# Patient Record
Sex: FEMALE | Race: WHITE | URBAN - METROPOLITAN AREA
[De-identification: names, ages, dates, MRNs, and addresses within clinical notes are randomized per-mention and may not be internally consistent; named-entity substitution may affect disease eponyms.]

---

## 2019-08-13 ENCOUNTER — OFFICE VISIT (OUTPATIENT)
Dept: URBAN - METROPOLITAN AREA CLINIC 76 | Facility: CLINIC | Age: 84
End: 2019-08-13
Payer: MEDICARE

## 2019-08-13 DIAGNOSIS — H43.812 VITREOUS DEGENERATION, LEFT EYE: ICD-10-CM

## 2019-08-13 DIAGNOSIS — E11.9 TYPE 2 DIABETES MELLITUS W/O COMPLICATION: Primary | ICD-10-CM

## 2019-08-13 PROCEDURE — 99204 OFFICE O/P NEW MOD 45 MIN: CPT | Performed by: OPTOMETRIST

## 2019-08-13 ASSESSMENT — KERATOMETRY
OD: 41.38
OS: 41.88

## 2019-08-13 ASSESSMENT — INTRAOCULAR PRESSURE
OD: 16
OS: 16

## 2019-08-13 NOTE — IMPRESSION/PLAN
Impression: Open angle with borderline findings, low risk, bilateral: H40.013. Cupping OU. IOP seems normal. Unknown FEH. Plan: GLC: Discussed condition in detail. Further testing needed. Schedule OCT of optic nerve, Visual Field 24-2 next available.

## 2019-08-13 NOTE — IMPRESSION/PLAN
Impression: Age-related nuclear cataract, bilateral: H25.13. poor vision OU Plan: Cataracts account for some of the patient's complaints. New glasses/contacts not expected to make significant improvement. Discussed options: surgery vs spectacle change. Patient declines surgery at this time.   Ok to schedule a cataract evaluation at patient's request.

## 2019-08-13 NOTE — IMPRESSION/PLAN
Impression: Central retinal vein occlusion, left eye, stable: L86.2518. only monoc hemorrhages OS Plan: Discussed diagnosis in detail with patient. Consult recommended with Dr. Jhonny Botello.

## 2019-08-13 NOTE — IMPRESSION/PLAN
Impression: Type 2 diabetes mellitus w/o complication: Z51.2. OU. Plan: DM: No diabetic retinopathy. Discussed ocular and systemic benefits of blood sugar control. Patient was instructed to monitor vision for changes and to call if noted. Letter sent to PCP.

## 2019-09-11 ENCOUNTER — OFFICE VISIT (OUTPATIENT)
Dept: URBAN - METROPOLITAN AREA CLINIC 76 | Facility: CLINIC | Age: 84
End: 2019-09-11
Payer: MEDICARE

## 2019-09-11 DIAGNOSIS — H40.013 OPEN ANGLE WITH BORDERLINE FINDINGS, LOW RISK, BILATERAL: Primary | ICD-10-CM

## 2019-09-11 DIAGNOSIS — H34.8122 CENTRAL RETINAL VEIN OCCLUSION, LEFT EYE, STABLE: ICD-10-CM

## 2019-09-11 PROCEDURE — 99213 OFFICE O/P EST LOW 20 MIN: CPT | Performed by: OPTOMETRIST

## 2019-09-11 PROCEDURE — 92083 EXTENDED VISUAL FIELD XM: CPT | Performed by: OPTOMETRIST

## 2019-09-11 PROCEDURE — 92133 CPTRZD OPH DX IMG PST SGM ON: CPT | Performed by: OPTOMETRIST

## 2019-09-11 ASSESSMENT — INTRAOCULAR PRESSURE
OS: 15
OD: 18

## 2019-09-11 ASSESSMENT — VISUAL ACUITY
OD: 20/50
OS: 20/100

## 2019-09-11 NOTE — IMPRESSION/PLAN
Impression: Age-related nuclear cataract, bilateral: H25.13. poor vision OU OU. Plan: Continue to monitor.

## 2019-09-11 NOTE — IMPRESSION/PLAN
Impression: Central retinal vein occlusion, left eye, stable: H46.2898. only monoc hemorrhages OS OS. Plan: Discussed diagnosis in detail with patient. Pt is scheduled w/ Dr. Isai Michelle 10/07/19.

## 2019-09-11 NOTE — IMPRESSION/PLAN
Impression: Open angle with borderline findings, low risk, bilateral: H40.013. Cupping OU. IOP seems normal. Unknown FEH. VF 9/11/19 OD: gen reduction, nasal defects?, OS: gen reduction, inf step? OCT 9/11/19: thin sup OU. Plan: GLC: Discussed condition in detail. Continue to monitor without treatment. Repeat OCT 6 mos.

## 2019-10-07 ENCOUNTER — OFFICE VISIT (OUTPATIENT)
Dept: URBAN - METROPOLITAN AREA CLINIC 76 | Facility: CLINIC | Age: 84
End: 2019-10-07
Payer: MEDICARE

## 2019-10-07 DIAGNOSIS — H25.13 AGE-RELATED NUCLEAR CATARACT, BILATERAL: Primary | ICD-10-CM

## 2019-10-07 DIAGNOSIS — H34.8120 CENTRAL RETINAL VEIN OCCLUSION, LEFT EYE, WITH MACULAR EDEMA: ICD-10-CM

## 2019-10-07 PROCEDURE — 99213 OFFICE O/P EST LOW 20 MIN: CPT | Performed by: OPHTHALMOLOGY

## 2019-10-07 PROCEDURE — 99204 OFFICE O/P NEW MOD 45 MIN: CPT | Performed by: OPHTHALMOLOGY

## 2019-10-07 PROCEDURE — 92134 CPTRZ OPH DX IMG PST SGM RTA: CPT | Performed by: OPHTHALMOLOGY

## 2019-10-07 ASSESSMENT — INTRAOCULAR PRESSURE
OS: 14
OD: 19

## 2019-10-07 NOTE — IMPRESSION/PLAN
Impression: Age-related nuclear cataract, bilateral: H25.13. Plan: Cataracts account for the patient's complaints. Discussed all risks, benefits, procedures and recovery. Patient understands changing glasses will not improve vision. Patient desires to have surgery.

## 2019-10-07 NOTE — IMPRESSION/PLAN
Impression: Central retinal vein occlusion, left eye, with macular edema: H34.8120. Plan: Discussed diagnosis in detail with patient. Discussed treatment options with patient. Discussed injection, Unable to see details on exam. Based on OCT exam shows edema left eye. Pt elects no treatment.

## 2023-04-21 ENCOUNTER — OFFICE VISIT (OUTPATIENT)
Dept: URBAN - METROPOLITAN AREA CLINIC 76 | Facility: CLINIC | Age: 88
End: 2023-04-21
Payer: MEDICARE

## 2023-04-21 DIAGNOSIS — H34.8120 CENTRAL RETINAL VEIN OCCLUSION W/ MACULAR EDEMA, LEFT EYE: ICD-10-CM

## 2023-04-21 DIAGNOSIS — H25.13 AGE-RELATED NUCLEAR CATARACT, BILATERAL: ICD-10-CM

## 2023-04-21 DIAGNOSIS — H33.22 SEROUS RETINAL DETACHMENT, LEFT EYE: Primary | ICD-10-CM

## 2023-04-21 PROCEDURE — 92134 CPTRZ OPH DX IMG PST SGM RTA: CPT | Performed by: OPHTHALMOLOGY

## 2023-04-21 PROCEDURE — 99204 OFFICE O/P NEW MOD 45 MIN: CPT | Performed by: OPHTHALMOLOGY

## 2023-04-21 ASSESSMENT — INTRAOCULAR PRESSURE
OD: 18
OS: 17

## 2023-04-21 NOTE — IMPRESSION/PLAN
Impression: Age-related nuclear cataract, bilateral: H25.13. Plan: Dense NS - recommend cataract surgery OD first (to improve quality of life) and then OS - refer back for retina care after cataract surgery OS. Pt understands guarded visual potential OS.

## 2023-04-21 NOTE — IMPRESSION/PLAN
Impression: Central retinal vein occlusion w/ macular edema, left eye: H34.8199. Plan: As above, 1). Guarded visual potential - treatment w/ long-acting steroid injections much better option than monthly anti-VEGF injections.

## 2023-04-21 NOTE — IMPRESSION/PLAN
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 Guarded visual potential. VERY ANXIOUS ABOUT TOUCHING EYES - would NOT be able to proceed w/ in-office injections. Recommend OZURDEX under anesthesia and possibly ILLUVIEN thereafter. PROCEED W/ CATARACT SURGERY FIRST to be able to better assess first. RTC w/ me after cataract surgery.

## 2023-06-06 ENCOUNTER — OFFICE VISIT (OUTPATIENT)
Dept: URBAN - METROPOLITAN AREA CLINIC 76 | Facility: CLINIC | Age: 88
End: 2023-06-06
Payer: MEDICARE

## 2023-06-06 DIAGNOSIS — E11.9 TYPE 2 DIABETES MELLITUS W/O COMPLICATION: ICD-10-CM

## 2023-06-06 DIAGNOSIS — H34.8120 CENTRAL RETINAL VEIN OCCLUSION W/ MACULAR EDEMA, LEFT EYE: ICD-10-CM

## 2023-06-06 DIAGNOSIS — H40.013 OPEN ANGLE WITH BORDERLINE FINDINGS, LOW RISK, BILATERAL: ICD-10-CM

## 2023-06-06 DIAGNOSIS — H52.223 REGULAR ASTIGMATISM, BILATERAL: ICD-10-CM

## 2023-06-06 DIAGNOSIS — H25.13 AGE-RELATED NUCLEAR CATARACT, BILATERAL: Primary | ICD-10-CM

## 2023-06-06 DIAGNOSIS — H33.22 SEROUS RETINAL DETACHMENT, LEFT EYE: ICD-10-CM

## 2023-06-06 PROCEDURE — 99215 OFFICE O/P EST HI 40 MIN: CPT | Performed by: STUDENT IN AN ORGANIZED HEALTH CARE EDUCATION/TRAINING PROGRAM

## 2023-06-06 PROCEDURE — 92136 OPHTHALMIC BIOMETRY: CPT | Performed by: STUDENT IN AN ORGANIZED HEALTH CARE EDUCATION/TRAINING PROGRAM

## 2023-06-06 RX ORDER — KETOROLAC TROMETHAMINE 5 MG/ML
0.5 % SOLUTION OPHTHALMIC
Qty: 5 | Refills: 1 | Status: ACTIVE
Start: 2023-06-06

## 2023-06-06 ASSESSMENT — INTRAOCULAR PRESSURE
OS: 16
OD: 16

## 2023-06-06 ASSESSMENT — KERATOMETRY
OD: 41.63
OS: 41.88

## 2023-06-06 ASSESSMENT — PACHYMETRY
OS: 24.71
OD: 2.83
OD: 25.07
OS: 2.92

## 2023-06-06 ASSESSMENT — VISUAL ACUITY
OS: HM
OD: 20/70

## 2023-06-06 NOTE — IMPRESSION/PLAN
Impression: Age-related nuclear cataract, bilateral: H25.13. Plan: Chart has been reviewed and additional testing is necessary including A-scan/Lens Biometry, Topography, and macula OCT. Discussed cataracts, treatment options, and surgical risks/benefits with patient. Patient understands there are tradeoffs to each intraocular lens choice and glasses may still be necessary after surgery. The patient is bothered by the symptoms of their cataract which is not correctable with a change in glasses and their ADL's are impaired. Patient elects surgical treatment and wishes to proceed. There is reasonable expectation that both the patient's visual and functional status will improve after surgery. Post op care to be patient's choice of provider/clinic and may include patient's choice of comanagement with a local or distant optometrist/ophthalmologist. Recommend ORA. Recommend Dextenza or Topical Drops postoperative. Advised the need for additional supplementation with Ketorolac BID x 2 weeks if using Dextenza. Lens Recommendation: MONOFOCAL or [employer]: OK for [employer] Aim OD: -0.25. Aim OS: -0.25. First Eye: OD then OS Notes: retina planning to treat w/ injections OS after CE IOL
**Pt has SHAINA Rosenthal or Evelyn Harkins***

## 2023-06-06 NOTE — IMPRESSION/PLAN
Impression: Type 2 diabetes mellitus w/o complication: Q53.0. Plan: No signs of diabetic retinal changes, no treatment indicated at this time. Discussed ocular and systemic benefits of blood sugar control with regular visits with PCP. Recommend yearly diabetic eye exams.

## 2023-06-06 NOTE — IMPRESSION/PLAN
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 Plan: Discussed condition and guarded prognosis. Return to Dr. Selina Millan for RD repair after CE IOL OS.

## 2023-06-06 NOTE — IMPRESSION/PLAN
Impression: Open angle with borderline findings, low risk, bilateral: H40.013. IOP good OU today. Plan: Return to Dr. Kendal Shah after CE IOL and retina stable.

## 2023-06-06 NOTE — IMPRESSION/PLAN
Impression: Central retinal vein occlusion w/ macular edema, left eye: H37.4065. Plan: Discussed condition and added risks. Continue to monitor with Dr. Jerilyn Fong.

## 2023-06-06 NOTE — IMPRESSION/PLAN
Impression: Regular astigmatism, bilateral: H52.223. Plan: Recommend TORIC OU for best vision. Pt aware of the need for glasses after surgery if astigmatism is not corrected.